# Patient Record
Sex: MALE | Race: WHITE | NOT HISPANIC OR LATINO | Employment: FULL TIME | URBAN - METROPOLITAN AREA
[De-identification: names, ages, dates, MRNs, and addresses within clinical notes are randomized per-mention and may not be internally consistent; named-entity substitution may affect disease eponyms.]

---

## 2017-04-20 ENCOUNTER — OFFICE VISIT (OUTPATIENT)
Dept: FAMILY MEDICINE | Facility: CLINIC | Age: 47
End: 2017-04-20
Payer: COMMERCIAL

## 2017-04-20 VITALS — SYSTOLIC BLOOD PRESSURE: 125 MMHG | TEMPERATURE: 99.1 F | HEART RATE: 68 BPM | DIASTOLIC BLOOD PRESSURE: 73 MMHG

## 2017-04-20 DIAGNOSIS — Z23 NEED FOR VACCINATION: ICD-10-CM

## 2017-04-20 DIAGNOSIS — Z71.84 TRAVEL ADVICE ENCOUNTER: Primary | ICD-10-CM

## 2017-04-20 PROCEDURE — 90691 TYPHOID VACCINE IM: CPT | Mod: GA | Performed by: NURSE PRACTITIONER

## 2017-04-20 PROCEDURE — 90472 IMMUNIZATION ADMIN EACH ADD: CPT | Mod: GA | Performed by: NURSE PRACTITIONER

## 2017-04-20 PROCEDURE — 90707 MMR VACCINE SC: CPT | Mod: GA | Performed by: NURSE PRACTITIONER

## 2017-04-20 PROCEDURE — 90471 IMMUNIZATION ADMIN: CPT | Mod: GA | Performed by: NURSE PRACTITIONER

## 2017-04-20 PROCEDURE — 90686 IIV4 VACC NO PRSV 0.5 ML IM: CPT | Mod: GA | Performed by: NURSE PRACTITIONER

## 2017-04-20 PROCEDURE — 90632 HEPA VACCINE ADULT IM: CPT | Mod: GA | Performed by: NURSE PRACTITIONER

## 2017-04-20 PROCEDURE — 90715 TDAP VACCINE 7 YRS/> IM: CPT | Mod: GA | Performed by: NURSE PRACTITIONER

## 2017-04-20 PROCEDURE — 99402 PREV MED CNSL INDIV APPRX 30: CPT | Mod: 25 | Performed by: NURSE PRACTITIONER

## 2017-04-20 RX ORDER — ATOVAQUONE AND PROGUANIL HYDROCHLORIDE 250; 100 MG/1; MG/1
1 TABLET, FILM COATED ORAL DAILY
Qty: 15 TABLET | Refills: 0 | Status: SHIPPED | OUTPATIENT
Start: 2017-04-20 | End: 2021-04-27

## 2017-04-20 RX ORDER — AZITHROMYCIN 500 MG/1
500 TABLET, FILM COATED ORAL DAILY
Qty: 3 TABLET | Refills: 0 | Status: SHIPPED | OUTPATIENT
Start: 2017-04-20 | End: 2017-04-23

## 2017-04-20 NOTE — PROGRESS NOTES
Nurse Note      Itinerary:  [facility]       Departure Date: 05/04/2017      Return Date: 05/08/2017      Length of Trip 4 days      Reason for Travel: [place] work           Urban or rural: both      Accommodations: Family home        IMMUNIZATION HISTORY  Have you received any immunizations within the past 4 weeks?  No  Have you ever fainted from having your blood drawn or from an injection?  Yes  Have you ever had a fever reaction to vaccination?  No  Have you ever had any bad reaction or side effect from any vaccination?  No  Have you ever had hepatitis A or B vaccine?  Yes  Do you live (or work closely) with anyone who has AIDS, an AIDS-like condition, any other immune disorder or who is on chemotherapy for cancer?  Yes  Do you have a family history of immunodeficiency?  No  Have you received any injection of immune globulin or any blood products during the past 12 months?  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  Patient will be departing in  2 week(s) and staying for   4 day(s) and  traveling with [facility] through [place].      Patient itinerary :  will be in the urban region of [facility] [place] [facility] which presents risk for Malaria, Dengue Fever, Chikungungya, Zika, Rabies, food borne illnesses, motor vehicle accidents, Typhoid and Chagas disease. exposure.      Patient's activities will include volunteer work.    Patient's country of birth is [place]    Special medical concerns: none  Pre-travel questionnaire was completed by patient and reviewed by provider.     Vitals: There were no vitals taken for this visit.  BMI= There is no height or weight on file to calculate BMI.    EXAM:  General:  Well-nourished, well-developed in no acute distress.  Appears to be stated age, interacts appropriately and expresses understanding of information given to patient.    Current  Outpatient Prescriptions   Medication Sig Dispense Refill     atorvastatin (LIPITOR) 40 MG tablet   0     aspirin 81 MG tablet Take by mouth daily       Patient Active Problem List   Diagnosis     ADD (attention deficit disorder) without hyperactivity     CARDIOVASCULAR SCREENING; LDL GOAL LESS THAN 160     Diverticulitis of colon     MONTY (obstructive sleep apnea)     Syncope and collapse     Obesity     No Known Allergies      Immunizations discussed include:   Hepatitis A:  Ordered/given today, risks, benefits and side effects reviewed  Hepatitis B: deferred  Influenza: Ordered/given today, risks, benefits and side effects reviewed  Typhoid: Ordered/given today, risks, benefits and side effects reviewed  Rabies: Insufficient time to vaccinate  Yellow Fever: Not indicated  Japanese Encephalitis: Not indicated  Meningococcus: Not indicated  Tetanus/Diphtheria: Ordered/given today, risks, benefits and side effects reviewed  Measles/Mumps/Rubella: Ordered/given today  Cholera: Not needed  Polio: Up to date  Pneumococcal: Under age of 65  Varicella: Immune by disease history per patient report  Zostavax:  Not indicated  HPV:  Not indicated  TB:  Low risk    [place] Exposure on this trip: No    ASSESSMENT/PLAN:    ICD-10-CM    1. Travel advice encounter Z71.89 TDAP VACCINE (ADACEL)     HEPA VACCINE ADULT IM     TYPHOID VACCINE, IM     HC FLU VAC PRESRV FREE QUAD SPLIT VIR 3+YRS IM     MMR VIRUS IMMUNIZATION, SUBCUT     azithromycin (ZITHROMAX) 500 MG tablet     atovaquone-proguanil (MALARONE) 250-100 MG per tablet   2. Need for vaccination Z23 TDAP VACCINE (ADACEL)     HEPA VACCINE ADULT IM     TYPHOID VACCINE, IM     HC FLU VAC PRESRV FREE QUAD SPLIT VIR 3+YRS IM     MMR VIRUS IMMUNIZATION, SUBCUT     I have reviewed general recommendations for safe travel   including: food/water precautions, insect precautions, safer sex   practices given high prevalence of Zika, HIV and other STDs,   roadway safety. Educational  materials and [facility] report provided.    Malaraia prophylaxis recommended: Malarone  Symptomatic treatment for traveler's diarrhea: azithromycin  [place] illness prevention and treatment: no      Evacuation insurance advised and resources were provided to patient.    Total visit time 30 minutes  with over 50% of time spent counseling patient as detailed above.    Adela Flores CNP

## 2017-04-20 NOTE — MR AVS SNAPSHOT
After Visit Summary   4/20/2017    Joe De La Garza    MRN: [id number]           Patient Information     Date Of Birth          1970        Visit Information        Provider Department      4/20/2017 10:15 AM Adela Flores APRN CNP [facility]        Today's Diagnoses     Travel advice encounter    -  1    Need for vaccination          Care Instructions    Today April 20, 2017 you received the    Flu Vaccine    Hepatitis A Vaccine - Please return on 10/17/17 or later for your 2nd and final dose.    Tetanus (Tdap) Vaccine    MMR (Measles Mumps Rubella) Vaccine    Typhoid - injectable. This vaccine is valid for two years.   .    These appointments can be made as a NURSE ONLY visit.    **It is very important for the vaccinations to be given on the scheduled day(s), this helps ensure you receive the full effectiveness of the vaccine.**    Please call [facility] with any questions [phone]    Thank you for visiting [facility]            Follow-ups after your visit        Who to contact     If you have questions or need follow up information about today's clinic visit or your schedule please contact [facility] directly at [phone].  Normal or non-critical lab and imaging results will be communicated to you by MyChart, letter or phone within 4 business days after the clinic has received the results. If you do not hear from us within 7 days, please contact the clinic through [facility] or phone. If you have a critical or abnormal lab result, we will notify you by phone as soon as possible.  Submit refill requests through [facility] or call your pharmacy and they will forward the refill request to us. Please allow 3 business days for your refill to be completed.          Additional Information About Your Visit        MyChart Information     [facility] lets you send messages to your doctor, view your test results, renew your  "prescriptions, schedule appointments and more. To sign up, go to [email or web address] . Click on \"Log in\" on the left side of the screen, which will take you to the Welcome page. Then click on \"Sign up Now\" on the right side of the page.     You will be asked to enter the access code listed below, as well as some personal information. Please follow the directions to create your username and password.     Your access code is: [email or web address]  Expires: 2017 10:43 AM     Your access code will  in 90 days. If you need help or a new code, please call your [facility] clinic or [phone].        Care EveryWhere ID     This is your Care EveryWhere ID. This could be used by other organizations to access your [facility] medical records  [id number]        Your Vitals Were     Pulse Temperature                68 99.1  F (37.3  C) (Oral)           Blood Pressure from Last 3 Encounters:   17 125/73   14 136/83   14 127/78    Weight from Last 3 Encounters:   14 277 lb 3.2 oz (125.7 kg)   14 (!) 304 lb (137.9 kg)   10/02/13 297 lb (134.7 kg)              We Performed the Following     HC FLU VAC PRESRV FREE QUAD SPLIT VIR 3+YRS IM     HEPA VACCINE ADULT IM     MMR VIRUS IMMUNIZATION, SUBCUT     TDAP VACCINE (ADACEL)     TYPHOID VACCINE, IM          Today's Medication Changes          These changes are accurate as of: 17 10:43 AM.  If you have any questions, ask your nurse or doctor.               Start taking these medicines.        Dose/Directions    atovaquone-proguanil 250-100 MG per tablet   Commonly known as:  MALARONE   Used for:  Travel advice encounter   Started by:  Adela Flores APRN CNP        Dose:  1 tablet   Take 1 tablet by mouth daily Start 2 days before exposure to Malaria and continue daily till  7 days after exposure.   Quantity:  15 tablet   Refills:  0       azithromycin 500 MG tablet   Commonly known as:  ZITHROMAX   Used for:  Travel advice encounter "   Started by:  Adela Flores APRN CNP        Dose:  500 mg   Take 1 tablet (500 mg) by mouth daily for 3 doses Take 1 tablet a day for up to 3 days for severe diarrhea   Quantity:  3 tablet   Refills:  0            Where to get your medicines      These medications were sent to [facility] - [place] - [address] [place] AT [facility] [address] [place] [address]     Phone:  [phone]     atovaquone-proguanil 250-100 MG per tablet    azithromycin 500 MG tablet                Primary Care Provider Office Phone # Fax #    Barbie Tin Mann [phone]       [address] [address]        Thank you!     Thank you for choosing [facility]  for your care. Our goal is always to provide you with excellent care. Hearing back from our patients is one way we can continue to improve our services. Please take a few minutes to complete the written survey that you may receive in the mail after your visit with us. Thank you!             Your Updated Medication List - Protect others around you: Learn how to safely use, store and throw away your medicines at www.disposemymeds.org.          This list is accurate as of: 4/20/17 10:43 AM.  Always use your most recent med list.                   Brand Name Dispense Instructions for use    aspirin 81 MG tablet      Take by mouth daily       atovaquone-proguanil 250-100 MG per tablet    MALARONE    15 tablet    Take 1 tablet by mouth daily Start 2 days before exposure to Malaria and continue daily till  7 days after exposure.       azithromycin 500 MG tablet    ZITHROMAX    3 tablet    Take 1 tablet (500 mg) by mouth daily for 3 doses Take 1 tablet a day for up to 3 days for severe diarrhea

## 2017-04-20 NOTE — PATIENT INSTRUCTIONS
Today April 20, 2017 you received the    Flu Vaccine    Hepatitis A Vaccine - Please return on 10/17/17 or later for your 2nd and final dose.    Tetanus (Tdap) Vaccine    MMR (Measles Mumps Rubella) Vaccine    Typhoid - injectable. This vaccine is valid for two years.   .    These appointments can be made as a NURSE ONLY visit.    **It is very important for the vaccinations to be given on the scheduled day(s), this helps ensure you receive the full effectiveness of the vaccine.**    Please call [facility] with any questions [phone]    Thank you for visiting [facility]

## 2017-05-11 ENCOUNTER — TELEPHONE (OUTPATIENT)
Dept: FAMILY MEDICINE | Facility: CLINIC | Age: 47
End: 2017-05-11

## 2017-05-11 NOTE — TELEPHONE ENCOUNTER
Back from [facility] on Monday, felt ok on Tuesday but on Wednesday night upset stomach, chills, aches, head ache  Approx. 2 bouts of diarrhea in the past 24 hrs, eating and drinking fine.  Did take one dose of azithromycin for TD  Says diarrhea isn't too bad  Advised to rest, hydrate and tylenol or ibuprofen for body aches  Wife just finished chemo a month ago, continue with meticulous hand hygiene and recommend separate bathrooms and good clean up with bleach based   Pt well versed on hand hygiene and contact precautions, will continue to follow while ill  If not improving in the next 3-4 days or worsens will call back to clinic  Pt agreed with plan.  Tania Rajan RN

## 2017-05-11 NOTE — TELEPHONE ENCOUNTER
Reason for call:  Patient reporting a symptom    Symptom or request: Diarrhea, fever 99.5, muscle aches, chills, head ache    Duration (how long have symptoms been present): 2days    Have you been treated for this before? No    Additional comments: Pt travelled to [facility] on Monday night, pt was in [facility] for four days    Phone Number patient can be reached at:  Home number on file [phone] (home)    Best Time:  anytime    Can we leave a detailed message on this number:  YES    Call taken on 5/11/2017 at 10:37 AM by Judith Fletcher

## 2021-03-01 ENCOUNTER — ANCILLARY PROCEDURE (OUTPATIENT)
Dept: GENERAL RADIOLOGY | Facility: CLINIC | Age: 51
End: 2021-03-01
Attending: NURSE PRACTITIONER
Payer: COMMERCIAL

## 2021-03-01 ENCOUNTER — OFFICE VISIT (OUTPATIENT)
Dept: URGENT CARE | Facility: URGENT CARE | Age: 51
End: 2021-03-01
Payer: COMMERCIAL

## 2021-03-01 VITALS
DIASTOLIC BLOOD PRESSURE: 87 MMHG | OXYGEN SATURATION: 96 % | RESPIRATION RATE: 16 BRPM | HEART RATE: 80 BPM | SYSTOLIC BLOOD PRESSURE: 145 MMHG | BODY MASS INDEX: 41.06 KG/M2 | TEMPERATURE: 98.6 F | WEIGHT: 294.4 LBS
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
## 2021-03-01 DIAGNOSIS — M25.512 ACUTE PAIN OF LEFT SHOULDER: Primary | ICD-10-CM

## 2021-03-01 DIAGNOSIS — E66.01 MORBID OBESITY (H): ICD-10-CM

## 2021-03-01 PROCEDURE — 99204 OFFICE O/P NEW MOD 45 MIN: CPT | Performed by: NURSE PRACTITIONER

## 2021-03-01 PROCEDURE — 73030 X-RAY EXAM OF SHOULDER: CPT | Mod: LT | Performed by: RADIOLOGY

## 2021-03-01 ASSESSMENT — PAIN SCALES - GENERAL: PAINLEVEL: MILD PAIN (2)

## 2021-03-01 NOTE — PATIENT INSTRUCTIONS
Ice Ice Ice.    Wear sling for comfort.    Naproxen 500mg twice a day with food.    Patient Education     Rotator Cuff Tear  The rotator cuff is a group of muscles and tendons that surround the shoulder joint. These muscles and tendons hold the arm in its joint. They also help the shoulder move and rotate. The rotator cuff can be torn from overuse or injury. Gradual wear and tear can lead to inflammation of these tendons. This can progress to gradual or sudden tears.  Symptoms of a torn rotator cuff include:    Shoulder pain that gets worse when you raise your arm overhead    Weakness of the shoulder muscles with overhead activity    Popping and clicking when you move your shoulder    Shoulder pain that wakes you up at night when sleeping on the hurt shoulder  Your healthcare provider may suspect a rotator cuff injury based on your symptoms and a physical exam. You may also have an MRI or arthroscopy. Arthroscopy is a surgical procedure to look inside the joint through a small tube. X-rays may be taken to determine if there is another reason for your pain, such as an abnormality in the bone.  Partial rotator cuff tears can be treated by first resting, then strengthening the rotator cuff muscles. Anti-inflammatory medicines, such as ibuprofen or naproxen, are useful. Your healthcare provider can give you a limited number of steroid injections. Your provider may recommend surgery for complete tears and partial tears that don't respond to medical treatment.  Home care    Try to avoid activities that make your pain worse. This includes overhead activities, doing the same motion over and over, and heavy lifting.    You may use over-the-counter pain medicines to control pain, unless another medicine was prescribed. If you have chronic liver or kidney disease or ever had a stomach ulcer or gastrointestinal bleeding, talk with your healthcare provider before using these medicines.    If you were given a sling, use it for  comfort. After your pain decreases, don t keep your arm in the sling all the time. Take your arm out several times a day and move the shoulder joint, as you are able.    Your healthcare provider may recommend gentle pendulum exercises. Stand or sit with your arm vertical and close to your side. Relax your shoulder muscles and gently swing the arm forward and back, side to side, and in small circles for about 5 minutes. Do this once or twice a day. There should be only slight pain with this exercise.    You may benefit from physical therapy or a home exercise program to strengthen your shoulder muscles. This will also increase your pain-free range of motion. Applying heat before exercises can help prepare the muscles and joint for activity. Talk to your healthcare provider about what is best for your condition.  Follow-up care  Follow up with your healthcare provider, or as advised.  When to seek medical advice  Call your healthcare provider right away if the following occurs:    Increasing shoulder pain or pain radiating down the arm to the hand  Call 911  Call 911 or get immediate medical care if any of the following occur:    Rapid swelling in the involved shoulder or arm    Numbness, tingling, or loss of strength down the arm to the hand  Noreen last reviewed this educational content on 5/1/2018 2000-2020 [employer]. [address] [place] [address], [place] [address]. All rights reserved. This information is not intended as a substitute for professional medical care. Always follow your healthcare professional's instructions.

## 2021-03-01 NOTE — PROGRESS NOTES
Chief Complaint   Patient presents with     Fall     Patient slipped on the ice today and fell onto his left side, his shoulder took the impact. States with sitting pain is 2/10, with movement 8/10.         ICD-10-CM    1. Acute pain of left shoulder  M25.512 XR Shoulder Left G/E 3 Views     PHYSICAL THERAPY REFERRAL     Orthopedic & Spine  Referral   2. Fall due to slipping on ice or snow, initial encounter  W00.9XXA XR Shoulder Left G/E 3 Views   3. Morbid obesity (H)  E66.01    Injury is highly suspicious for rotator cuff tear.  Will treat pain and refer to physical therapy and orthopedics for further evaluation and treatment.    Medical Decision Making    Differential Diagnosis:  MS Injury Pain: sprain, fracture, tendonitis, muscle strain, contusion, dislocation, bursitis and osteoarthritis    Xray - Reviewed and interpreted by me.  X-ray of left shoulder shows no acute fractures or dislocations.    Results for orders placed or performed in visit on 03/01/21 (from the past 24 hour(s))   XR Shoulder Left G/E 3 Views    Narrative    XR SHOULDER LT G/E 3 VW  3/1/2021 2:08 PM     HISTORY: Acute pain of left shoulder; Fall due to slipping on ice or  snow, initial encounter    COMPARISON: None      Impression    IMPRESSION: No acute fracture or malalignment. There is normal  glenohumeral joint spacing. Mild acromioclavicular joint degenerative  changes. Calcification in the acromiohumeral interval suggests  calcific tendinopathy.      PRISCILA FREDERICK MD       Subjective     Joe De La Garza is an 50 year oldmale who presents to clinic today for pain in the right posterior shoulder since falling on the ice about one hour ago.  No treatment since occurrence. No previous injury to this area.  Patient denies any pain in other areas and did not hit his head.  He has very limited range of motion of his shoulder      Objective    BP (!) 145/87   Pulse 80   Temp 98.6  F (37  C) (Tympanic)   Resp 16   Wt 133.5  kg (294 lb 6.4 oz)   SpO2 96%   BMI 41.06 kg/m      Physical Exam       GENERAL APPEARANCE: alert and mild distress     EYES: PERRL, EOMI, sclera non-icteric     HENT: oral exam benign, mucus membranes intact, without ulcers or lesions     NECK: no adenopathy or asymmetry, thyroid normal to palpation     RESP: lungs clear to auscultation - no rales, rhonchi or wheezes     CV: regular rates and rhythm, no murmurs, rubs, or gallop     MS: extremities normal- no gross deformities noted except for the right shoulder which has limited range of motion.  Pain is not reproducible by palpation of right upper extremity shoulder and clavicle.  He is not able to externally rotate his arm at all has limited flexion and extension and is unable to raise the arm over his head.     SKIN: no suspicious lesions or rashes     NEURO: Normal strength and tone, mentation intact and speech normal    Patient Instructions   Ice Ice Ice.    Wear sling for comfort.    Naproxen 500mg twice a day with food.    Patient Education     Rotator Cuff Tear  The rotator cuff is a group of muscles and tendons that surround the shoulder joint. These muscles and tendons hold the arm in its joint. They also help the shoulder move and rotate. The rotator cuff can be torn from overuse or injury. Gradual wear and tear can lead to inflammation of these tendons. This can progress to gradual or sudden tears.  Symptoms of a torn rotator cuff include:    Shoulder pain that gets worse when you raise your arm overhead    Weakness of the shoulder muscles with overhead activity    Popping and clicking when you move your shoulder    Shoulder pain that wakes you up at night when sleeping on the hurt shoulder  Your healthcare provider may suspect a rotator cuff injury based on your symptoms and a physical exam. You may also have an MRI or arthroscopy. Arthroscopy is a surgical procedure to look inside the joint through a small tube. X-rays may be taken to determine if  there is another reason for your pain, such as an abnormality in the bone.  Partial rotator cuff tears can be treated by first resting, then strengthening the rotator cuff muscles. Anti-inflammatory medicines, such as ibuprofen or naproxen, are useful. Your healthcare provider can give you a limited number of steroid injections. Your provider may recommend surgery for complete tears and partial tears that don't respond to medical treatment.  Home care    Try to avoid activities that make your pain worse. This includes overhead activities, doing the same motion over and over, and heavy lifting.    You may use over-the-counter pain medicines to control pain, unless another medicine was prescribed. If you have chronic liver or kidney disease or ever had a stomach ulcer or gastrointestinal bleeding, talk with your healthcare provider before using these medicines.    If you were given a sling, use it for comfort. After your pain decreases, don t keep your arm in the sling all the time. Take your arm out several times a day and move the shoulder joint, as you are able.    Your healthcare provider may recommend gentle pendulum exercises. Stand or sit with your arm vertical and close to your side. Relax your shoulder muscles and gently swing the arm forward and back, side to side, and in small circles for about 5 minutes. Do this once or twice a day. There should be only slight pain with this exercise.    You may benefit from physical therapy or a home exercise program to strengthen your shoulder muscles. This will also increase your pain-free range of motion. Applying heat before exercises can help prepare the muscles and joint for activity. Talk to your healthcare provider about what is best for your condition.  Follow-up care  Follow up with your healthcare provider, or as advised.  When to seek medical advice  Call your healthcare provider right away if the following occurs:    Increasing shoulder pain or pain radiating  down the arm to the hand  Call 911  Call 911 or get immediate medical care if any of the following occur:    Rapid swelling in the involved shoulder or arm    Numbness, tingling, or loss of strength down the arm to the hand  Noreen last reviewed this educational content on 5/1/2018 2000-2020 [employer]. [address] [place] [address]. All rights reserved. This information is not intended as a substitute for professional medical care. Always follow your healthcare professional's instructions.               TERESA Lechuga, CNP  [facility] Provider

## 2021-04-26 ENCOUNTER — E-VISIT (OUTPATIENT)
Dept: FAMILY MEDICINE | Facility: CLINIC | Age: 51
End: 2021-04-26
Payer: COMMERCIAL

## 2021-04-26 DIAGNOSIS — Z12.11 COLON CANCER SCREENING: ICD-10-CM

## 2021-04-26 DIAGNOSIS — R10.32 LLQ ABDOMINAL PAIN: Primary | ICD-10-CM

## 2021-04-26 PROCEDURE — 99421 OL DIG E/M SVC 5-10 MIN: CPT | Performed by: FAMILY MEDICINE

## 2021-05-08 ENCOUNTER — HEALTH MAINTENANCE LETTER (OUTPATIENT)
Age: 51
End: 2021-05-08

## 2021-06-21 ENCOUNTER — TELEPHONE (OUTPATIENT)
Dept: FAMILY MEDICINE | Facility: CLINIC | Age: 51
End: 2021-06-21

## 2021-06-21 NOTE — TELEPHONE ENCOUNTER
Patient Quality Outreach      Summary:    Patient has the following on his problem list/HM: None    Patient is due/failing the following:   Colonoscopy    Type of outreach:    Sent [employer] message.    Questions for provider review:    None                                                                                                                                     Madeline Esparza

## 2021-07-04 ENCOUNTER — E-VISIT (OUTPATIENT)
Dept: FAMILY MEDICINE | Facility: CLINIC | Age: 51
End: 2021-07-04
Payer: COMMERCIAL

## 2021-07-04 DIAGNOSIS — Z87.19 HX OF DIVERTICULITIS OF COLON: ICD-10-CM

## 2021-07-04 DIAGNOSIS — R10.32 LLQ ABDOMINAL PAIN: ICD-10-CM

## 2021-07-04 PROCEDURE — 99421 OL DIG E/M SVC 5-10 MIN: CPT | Performed by: FAMILY MEDICINE

## 2021-07-05 RX ORDER — METRONIDAZOLE 500 MG/1
500 TABLET ORAL 3 TIMES DAILY
Qty: 30 TABLET | Refills: 0 | Status: SHIPPED | OUTPATIENT
Start: 2021-07-05 | End: 2024-01-03

## 2021-07-05 RX ORDER — CIPROFLOXACIN 500 MG/1
500 TABLET, FILM COATED ORAL 2 TIMES DAILY
Qty: 20 TABLET | Refills: 0 | Status: SHIPPED | OUTPATIENT
Start: 2021-07-05 | End: 2024-01-03

## 2021-10-23 ENCOUNTER — HEALTH MAINTENANCE LETTER (OUTPATIENT)
Age: 51
End: 2021-10-23

## 2022-06-04 ENCOUNTER — HEALTH MAINTENANCE LETTER (OUTPATIENT)
Age: 52
End: 2022-06-04

## 2022-10-10 ENCOUNTER — HEALTH MAINTENANCE LETTER (OUTPATIENT)
Age: 52
End: 2022-10-10

## 2022-10-20 ENCOUNTER — E-VISIT (OUTPATIENT)
Dept: FAMILY MEDICINE | Facility: CLINIC | Age: 52
End: 2022-10-20
Payer: COMMERCIAL

## 2022-10-20 DIAGNOSIS — R10.32 LLQ ABDOMINAL PAIN: ICD-10-CM

## 2022-10-20 DIAGNOSIS — Z12.11 COLON CANCER SCREENING: Primary | ICD-10-CM

## 2022-10-20 PROCEDURE — 99421 OL DIG E/M SVC 5-10 MIN: CPT | Performed by: FAMILY MEDICINE

## 2022-10-31 ENCOUNTER — TELEPHONE (OUTPATIENT)
Dept: GASTROENTEROLOGY | Facility: CLINIC | Age: 52
End: 2022-10-31

## 2022-10-31 ENCOUNTER — HOSPITAL ENCOUNTER (OUTPATIENT)
Facility: AMBULATORY SURGERY CENTER | Age: 52
End: 2022-10-31
Attending: INTERNAL MEDICINE
Payer: COMMERCIAL

## 2022-10-31 NOTE — TELEPHONE ENCOUNTER
Screening Questions  BLUE  KIND OF PREP RED  LOCATION [review exclusion criteria] GREEN  SEDATION TYPE        y Are you active on mychart?       Johnathan, Barbie Ordering/Referring Provider?        [employer] What type of coverage do you have?      n Have you had a positive covid test in the last 90 days?     40.4 1. BMI  [BMI 40+ - review exclusion criteria]    y  2. Are you able to give consent for your medical care? [IF NO,RN REVIEW]        n  3. Are you taking any prescription pain medications on a routine schedule?      n  3a. EXTENDED PREP What kind of prescription?     n 4. Do you have any chemical dependencies such as alcohol, street drugs, or methadone?    n 5. Do you have any history of post-traumatic stress syndrome, severe anxiety or history of psychosis?      **If yes 3- 5 , please schedule with MAC sedation.**          IF YES TO ANY 6 - 10 - HOSPITAL SETTING ONLY.     n 6.   Do you need assistance transferring?     n 7.   Have you had a heart or lung transplant?    n 8.   Are you currently on dialysis?   n 9.   Do you use daily home oxygen?   n 10. Do you take nitroglycerin?   10a.  If yes, how often?     11. [FEMALES]  n Are you currently pregnant?    11a.  If yes, how many weeks? [ Greater than 12 weeks, OR NEEDED]    n 12. Do you have Pulmonary Hypertension? *NEED PAC APPT AT [facility]*     n 13. [review exclusion criteria]  Do you have any implantable devices in your body (pacemaker, defib, LVAD)?    n 14. In the past 6 months, have you had any heart related issues including cardiomyopathy or heart attack?     14a.  If yes, did it require cardiac stenting if so when?     n 15. Have you had a stroke or Transient ischemic attack (TIA - aka  mini stroke ) within 6 months?      n 16. Do you have mod to severe Obstructive Sleep Apnea?  [Hospital only - Ok at [place]]    n 17. Do you have SEVERE AND UNCONTROLLED asthma? *NEED PAC APPT AT [facility]*     n 18. Are you currently taking any blood thinners?     18a.  "If yes, inform patient to \"follow up w/ ordering provider for bridging instructions.\"    n 19. Do you take the medication Phentermine?    19a. If yes, \"Hold for 7 days before procedure.  Please consult your prescribing provider if you have questions about holding this medication.\"     n  20. Do you have chronic kidney disease?      n  21. Do you have a diagnosis of diabetes?     n  22. On a regular basis do you go 3-5 days between bowel movements?      23. Preferred LOCAL Pharmacy for Pre Prescription    [ LIST ONLY ONE PHARMACY]     [facility] #[id number] [place] [id number] [place] AT [facility]  [address]        - CLOSING REMINDERS -    Informed patient they will need an adult    Cannot take any type of public or medical transportation alone    Conscious Sedation- Needs  for 6 hours after the procedure       MAC/General-Needs  for 24 hours after procedure    Pre-Procedure Covid test to be completed [[facility] PCR Testing Required]    Confirmed Nurse will call to complete assessment       - SCHEDULING DETAILS -     Tanner  Surgeon    1/2/23   Date of Procedure  Lower Endoscopy [Colonoscopy]  Type of Procedure Scheduled   MG Location  Extended PREP   SC Sedation Type     n PAC / Pre-op Required         Additional comments:            "

## 2022-12-01 ENCOUNTER — TELEPHONE (OUTPATIENT)
Dept: GASTROENTEROLOGY | Facility: CLINIC | Age: 52
End: 2022-12-01

## 2022-12-01 ENCOUNTER — MYC MEDICAL ADVICE (OUTPATIENT)
Dept: CALL CENTER | Age: 52
End: 2022-12-01

## 2022-12-01 NOTE — TELEPHONE ENCOUNTER
Caller: To Joe    Procedure: Colon    Date, Location, and Surgeon of Procedure Cancelled: 01/2, Seb SCRUGGS    Ordering Provider:Barbie Mann MD     Reason for cancel (please be detailed, any staff messages or encounters to note?): System Recognized Holiday        Rescheduled: N, wants a call back to reschedule     If rescheduled:    Date:    Location:    Prep Resent: (changes to prep?)   Covid Test Rescheduled:    Note any change or update to original order/sedation:

## 2022-12-05 ENCOUNTER — VIRTUAL VISIT (OUTPATIENT)
Dept: FAMILY MEDICINE | Facility: CLINIC | Age: 52
End: 2022-12-05
Payer: COMMERCIAL

## 2022-12-05 DIAGNOSIS — U07.1 INFECTION DUE TO 2019 NOVEL CORONAVIRUS: Primary | ICD-10-CM

## 2022-12-05 PROCEDURE — 99213 OFFICE O/P EST LOW 20 MIN: CPT | Mod: CS | Performed by: NURSE PRACTITIONER

## 2022-12-05 ASSESSMENT — ENCOUNTER SYMPTOMS
HEADACHES: 1
COUGH: 1
SORE THROAT: 1

## 2022-12-05 NOTE — PROGRESS NOTES
Joe is a 52 year old who is being evaluated via a billable video visit.      How would you like to obtain your AVS? MyChart  If the video visit is dropped, the invitation should be resent by: Text to cell phone: [phone]  Will anyone else be joining your video visit? No          Assessment & Plan     (U07.1) Infection due to 2019 novel coronavirus  (primary encounter diagnosis)  Comment:   Plan: he assures me he has never been told he has poor kidney function.   Symptom management and med use/risk/benefit                 No follow-ups on file.    Yolanda Lei NP  [facility]    Subjective   Joe is a 52 year old presenting for the following health issues: tested positive for COVID 12/02/2022, would like to discuss antiviral treatment,, Symptoms started 4 days. No SOB. Some fever.    Covid Concern, Headache, Cough, and Pharyngitis      Headache     Cough  Associated symptoms include headaches and sore throat.   Pharyngitis   Associated symptoms include headaches and cough.          COVID-19 Symptom Review  How many days ago did these symptoms start? Symptoms started 12/1/22    Are any of the following symptoms significant for you?    New or worsening difficulty breathing? No    Worsening cough? Yes, it's a dry cough. - does get productive as the day goes on    Fever or chills? Yes, the highest temperature was 101    Headache: YES    Sore throat: YES    Chest pain: No    Diarrhea: No     Body aches? YES    What treatments has patient tried? DayQuil   Does patient live in a nursing home, group home, or shelter? No   Does patient have a way to get food/medications during quarantined? Yes, I have a friend or family member who can help me.                  Review of Systems   HENT: Positive for sore throat.    Respiratory: Positive for cough.    Neurological: Positive for headaches.            Objective           Vitals:  No vitals were obtained today due to virtual visit.    Physical  Exam   GENERAL: Healthy, alert and no distress  EYES: Eyes grossly normal to inspection.  No discharge or erythema, or obvious scleral/conjunctival abnormalities.  RESP: No audible wheeze, cough, or visible cyanosis.  No visible retractions or increased work of breathing.    SKIN: Visible skin clear. No significant rash, abnormal pigmentation or lesions.  NEURO: Cranial nerves grossly intact.  Mentation and speech appropriate for age.  PSYCH: Mentation appears normal, affect normal/bright, judgement and insight intact, normal speech and appearance well-groomed.                Video-Visit Details    Video Start Time: 1205    Type of service:  Video Visit    Video End Time:12:20 PM    Originating Location (pt. Location): Home        Distant Location (provider location):  On-site    Platform used for Video Visit: Libby

## 2023-01-25 ENCOUNTER — TELEPHONE (OUTPATIENT)
Dept: GASTROENTEROLOGY | Facility: CLINIC | Age: 53
End: 2023-01-25

## 2023-01-25 ENCOUNTER — TELEPHONE (OUTPATIENT)
Dept: GASTROENTEROLOGY | Facility: CLINIC | Age: 53
End: 2023-01-25
Payer: COMMERCIAL

## 2023-01-25 DIAGNOSIS — Z12.11 ENCOUNTER FOR SCREENING COLONOSCOPY: Primary | ICD-10-CM

## 2023-01-25 RX ORDER — BISACODYL 5 MG
TABLET, DELAYED RELEASE (ENTERIC COATED) ORAL
Qty: 4 TABLET | Refills: 0 | Status: SHIPPED | OUTPATIENT
Start: 2023-01-25 | End: 2024-01-03

## 2023-01-25 NOTE — TELEPHONE ENCOUNTER
Caller: Joe De La Garza    Reason for Reschedule/Cancellation (please be detailed, any staff messages or encounters to note?): Per staff message patient to be rescheduled at hospital setting due to MONTY.    Marian Vang RN  P Endoscopy Scheduling Pool  Could someone please reach out to patient and r/s at the hospital (see note below).  Thank you,   José Miguel Xavier MD Soldo, Jennifer, RN; PAVAN Collins   Cc: PAVAN Alcaraz Rn Gi Procedures   Due to no recent sleep study I can see and past hypoxic episodes would be best at hospital.     Sunday           Previous Messages       ----- Message -----   From: Marian Vang RN   Sent: 1/24/2023  11:27 AM CST   To: Mg Glory Collins,  Rn Gi Procedures   Subject: MONTY                                               Please review and advise to determine if patient can proceed with scheduled procedure or if a hospital setting is recommended.     Patient is scheduled for a Colonoscopy   Date of procedure: 02.03.2023   Indication: screening   Sedation type: Conscious sedation     Reason for review: moderate to severe MONTY       Thank you,   Marian Vang RN   Endoscopy Procedure Pre Assessment RN   [phone] option 4       Prior to reschedule please review:    Ordering Provider:Barbie Mann MD    Sedation per order:MODERATE    Does patient have any ASC Exclusions, please identify?: YES, MONTY      Notes on Cancelled Procedure:    Procedure:Lower Endoscopy [Colonoscopy]     Date: 2/3    Location:[facility]; [address], [place] [address]    Surgeon: ISAEL        Rescheduled: Yes    Procedure: Lower Endoscopy [Colonoscopy]     Date: 2/3    Location:[facility]; [address] [place] [address]    Surgeon: HAYDEE RANDALL    Sedation Level Scheduled  MODERATE,  Reason for Sedation Level PER ORDER    Prep/Instructions updated and sent: RESENT VIA [employer]

## 2023-01-25 NOTE — TELEPHONE ENCOUNTER
Sent to scheduling to r/s at the hospital.  See note below.         José Miguel Hoyt MD Soldo, Jennifer, RN; PAVAN Holder Review  Cc: P Mg Rn Gi Procedures  Due to no recent sleep study I can see and past hypoxic episodes would be best at hospital.     Sunday           Previous Messages     ----- Message -----   From: Marian Vang RN   Sent: 1/24/2023  11:27 AM CST   To: Mg Holder Review, Mg Rn Gi Procedures   Subject: MONTY                                               Please review and advise to determine if patient can proceed with scheduled procedure or if a hospital setting is recommended.     Patient is scheduled for a Colonoscopy   Date of procedure: 02.03.2023   Indication: screening   Sedation type: Conscious sedation     Reason for review: moderate to severe MONTY       Thank you,   Marian Vang RN   Endoscopy Procedure Pre Assessment RN   [phone] option 4

## 2023-01-25 NOTE — TELEPHONE ENCOUNTER
Patient scheduled for Colonoscopy  on 2/3/23.     Discuss Covid policy.     Pre op exam scheduled: N/A    Arrival time: 1230. Procedure time 1330    Facility location: [facility]; [address] [place] [id number]    Sedation type: Conscious sedation     Anticoagulations? No    Electronic implanted devices? No    Diabetic? No    Indication for procedure: screening     Bowel prep recommendation: Extended prep Golytely  due to BMI    Prep instructions sent via [employer] Bowel prep script sent to [facility] #[id number] [facility] [id number] [place] AT [facility]  [address]    Pre visit planning completed.    Bessie Dwyer RN  Endoscopy Procedure Pre Assessment RN

## 2023-01-26 NOTE — TELEPHONE ENCOUNTER
Attempted to contact patient back. No answer, left VM to call back [phone] option 4 to go over upcoming appointment details.

## 2023-01-27 NOTE — TELEPHONE ENCOUNTER
Second attempt for pre-assessment prior to upcoming colonoscopy     No answer.  Left message to return call [phone] #4    Marian Schaeffer RN  Endoscopy Procedure Pre Assessment RN

## 2023-02-01 ENCOUNTER — TELEPHONE (OUTPATIENT)
Dept: GASTROENTEROLOGY | Facility: CLINIC | Age: 53
End: 2023-02-01
Payer: COMMERCIAL

## 2023-02-01 NOTE — TELEPHONE ENCOUNTER
Caller: Joe De La Garza   Reason for Reschedule/Cancellation (please be detailed, any staff messages or encounters to note?):     PER PT -- NO DRVER/SICK WITH FEVER       Prior to reschedule please review:    Ordering Provider:Barbie Mann MD      Sedation per order:MODERATE     Does patient have any ASC Exclusions, please identify?:     José Miguel Knox MD Soldo, Jennifer, RN; PAVAN Mg Anes Review   Cc: PAVAN Alcaraz Rn Gi Procedures   Due to no recent sleep study I can see and past hypoxic episodes would be best at hospital.     Sunday         Notes on Cancelled Procedure:  1. Procedure:Lower Endoscopy [Colonoscopy]   2. Date: 02/03/2023  3. Location:[facility]; [address] [place] [address]  4. Surgeon: RACHEL         Rescheduled: YES     Procedure: Lower Endoscopy [Colonoscopy]    Date: 03/08/2023    Location:[facility]; [address] [facility], [address] [place] [address]    Surgeon: DOMENIC     Sedation Level Scheduled  MODERATE          Reason for Sedation Level PER ORDER     Prep/Instructions updated and sent: JERRI

## 2023-02-01 NOTE — TELEPHONE ENCOUNTER
Third attempt for pre-assessment prior to upcoming colonoscopy     MyChart  Message sent as well.    No answer.  Left message to return call [phone] #4    Marian Schaeffer RN  Endoscopy Procedure Pre Assessment RN

## 2023-02-22 NOTE — TELEPHONE ENCOUNTER
Rescheduled colonoscopy     Patient scheduled for Colonoscopy  on 3.7.23.     Discuss Covid policy.     Pre op exam scheduled: N/A    Arrival time: 0815. Procedure time 0915    Facility location: [facility]; [address] [facility], [address], [place] [address]    Sedation type: MAC    Anticoagulations? No    Electronic implanted devices? No    Diabetic? No    Indication for procedure: Screening    Bowel prep recommendation: Extended prep Golytely     Prep instructions sent via [employer] Bowel prep script sent to [facility] #[id number] [facility] [id number] [place] AT [facility]  [address]    Pre visit planning completed.    Lenora Maki RN  Endoscopy Procedure Pre Assessment RN

## 2023-02-23 NOTE — TELEPHONE ENCOUNTER
Attempted to contact patient regarding upcoming Colonoscopy  procedure on 3.7.23 for pre assessment questions. No answer.     Left message to return call to [phone] #[phone]      Lenora Maki RN  Endoscopy Procedure Pre Assessment RN

## 2023-02-23 NOTE — TELEPHONE ENCOUNTER
Addendum:    Patient is scheduled 3.8.23 for colonoscopy, NOT 3.7.23 as erroneously entered in previous note.     Lenora Maki RN

## 2023-03-01 NOTE — TELEPHONE ENCOUNTER
Second attempt for pre-assessment prior to upcoming colonoscopy on 3.8.23     No answer.  Left message to return call [phone] #4    MyChart message sent    Lenora Maki RN  Endoscopy Procedure Pre Assessment RN

## 2023-03-06 NOTE — TELEPHONE ENCOUNTER
Pre assessment questions completed for upcoming Colonoscopy  procedure scheduled on 3.8.23    COVID policy reviewed.     Reviewed procedural arrival time 0815 and facility location [facility]; [address] [facility], 3rd Floor, [place] [address]    Designated  policy reviewed. Instructed to have someone stay 6 hours post procedure.     Anticoagulation/blood thinners? No    Electronic implanted devices? No    Diabetic? No    Reviewed extended golytely procedure prep instructions.     Patient verbalized understanding and had no questions or concerns at this time.    Lenora Maki RN  Endoscopy Procedure Pre Assessment RN

## 2023-03-08 ENCOUNTER — HOSPITAL ENCOUNTER (OUTPATIENT)
Facility: CLINIC | Age: 53
Discharge: HOME | End: 2023-03-08
Attending: INTERNAL MEDICINE | Admitting: INTERNAL MEDICINE
Payer: COMMERCIAL

## 2023-03-08 VITALS
RESPIRATION RATE: 15 BRPM | HEIGHT: 72 IN | BODY MASS INDEX: 40.63 KG/M2 | SYSTOLIC BLOOD PRESSURE: 135 MMHG | HEART RATE: 84 BPM | DIASTOLIC BLOOD PRESSURE: 84 MMHG | TEMPERATURE: 98.5 F | OXYGEN SATURATION: 96 % | WEIGHT: 300 LBS

## 2023-03-08 DIAGNOSIS — E66.813 CLASS 3 SEVERE OBESITY WITH BODY MASS INDEX (BMI) OF 40.0 TO 44.9 IN ADULT, UNSPECIFIED OBESITY TYPE, UNSPECIFIED WHETHER SERIOUS COMORBIDITY PRESENT (H): Primary | ICD-10-CM

## 2023-03-08 DIAGNOSIS — E66.01 CLASS 3 SEVERE OBESITY WITH BODY MASS INDEX (BMI) OF 40.0 TO 44.9 IN ADULT, UNSPECIFIED OBESITY TYPE, UNSPECIFIED WHETHER SERIOUS COMORBIDITY PRESENT (H): Primary | ICD-10-CM

## 2023-03-08 LAB
ALBUMIN SERPL BCG-MCNC: 4.6 G/DL (ref 3.5–5.2)
ALP SERPL-CCNC: 37 U/L (ref 40–129)
ALT SERPL W P-5'-P-CCNC: 43 U/L (ref 10–50)
ANION GAP SERPL CALCULATED.3IONS-SCNC: 13 MMOL/L (ref 7–15)
AST SERPL W P-5'-P-CCNC: 35 U/L (ref 10–50)
BILIRUB SERPL-MCNC: 0.9 MG/DL
BUN SERPL-MCNC: 10.1 MG/DL (ref 6–20)
CALCIUM SERPL-MCNC: 9.4 MG/DL (ref 8.6–10)
CHLORIDE SERPL-SCNC: 103 MMOL/L (ref 98–107)
CREAT SERPL-MCNC: 1.02 MG/DL (ref 0.67–1.17)
DEPRECATED HCO3 PLAS-SCNC: 23 MMOL/L (ref 22–29)
ERYTHROCYTE [DISTWIDTH] IN BLOOD BY AUTOMATED COUNT: 13.7 % (ref 10–15)
FERRITIN SERPL-MCNC: 220 NG/ML (ref 31–409)
GFR SERPL CREATININE-BSD FRML MDRD: 88 ML/MIN/1.73M2
GLUCOSE SERPL-MCNC: 82 MG/DL (ref 70–99)
HBV SURFACE AG SERPL QL IA: NONREACTIVE
HCT VFR BLD AUTO: 45.3 % (ref 40–53)
HCV AB SERPL QL IA: NONREACTIVE
HGB BLD-MCNC: 14.6 G/DL (ref 13.3–17.7)
IRON BINDING CAPACITY (ROCHE): 396 UG/DL (ref 240–430)
IRON SATN MFR SERPL: 19 % (ref 15–46)
IRON SERPL-MCNC: 75 UG/DL (ref 61–157)
MCH RBC QN AUTO: 29.1 PG (ref 26.5–33)
MCHC RBC AUTO-ENTMCNC: 32.2 G/DL (ref 31.5–36.5)
MCV RBC AUTO: 90 FL (ref 78–100)
PLATELET # BLD AUTO: 231 10E3/UL (ref 150–450)
POTASSIUM SERPL-SCNC: 4.1 MMOL/L (ref 3.4–5.3)
PROT SERPL-MCNC: 7.3 G/DL (ref 6.4–8.3)
RBC # BLD AUTO: 5.01 10E6/UL (ref 4.4–5.9)
SODIUM SERPL-SCNC: 139 MMOL/L (ref 136–145)
WBC # BLD AUTO: 7.1 10E3/UL (ref 4–11)

## 2023-03-08 PROCEDURE — 83550 IRON BINDING TEST: CPT | Performed by: INTERNAL MEDICINE

## 2023-03-08 PROCEDURE — G0500 MOD SEDAT ENDO SERVICE >5YRS: HCPCS | Performed by: INTERNAL MEDICINE

## 2023-03-08 PROCEDURE — 45385 COLONOSCOPY W/LESION REMOVAL: CPT | Mod: PT

## 2023-03-08 PROCEDURE — 999N000099 HC STATISTIC MODERATE SEDATION < 10 MIN: Performed by: INTERNAL MEDICINE

## 2023-03-08 PROCEDURE — 86803 HEPATITIS C AB TEST: CPT | Performed by: INTERNAL MEDICINE

## 2023-03-08 PROCEDURE — 99153 MOD SED SAME PHYS/QHP EA: CPT | Performed by: INTERNAL MEDICINE

## 2023-03-08 PROCEDURE — 80053 COMPREHEN METABOLIC PANEL: CPT | Performed by: INTERNAL MEDICINE

## 2023-03-08 PROCEDURE — 82728 ASSAY OF FERRITIN: CPT | Performed by: INTERNAL MEDICINE

## 2023-03-08 PROCEDURE — 88305 TISSUE EXAM BY PATHOLOGIST: CPT | Mod: TC | Performed by: INTERNAL MEDICINE

## 2023-03-08 PROCEDURE — 87340 HEPATITIS B SURFACE AG IA: CPT | Performed by: INTERNAL MEDICINE

## 2023-03-08 PROCEDURE — 85018 HEMOGLOBIN: CPT | Performed by: INTERNAL MEDICINE

## 2023-03-08 PROCEDURE — 45380 COLONOSCOPY AND BIOPSY: CPT | Performed by: INTERNAL MEDICINE

## 2023-03-08 PROCEDURE — 250N000011 HC RX IP 250 OP 636: Performed by: INTERNAL MEDICINE

## 2023-03-08 PROCEDURE — 88305 TISSUE EXAM BY PATHOLOGIST: CPT | Mod: 26 | Performed by: PATHOLOGY

## 2023-03-08 PROCEDURE — 36415 COLL VENOUS BLD VENIPUNCTURE: CPT | Performed by: INTERNAL MEDICINE

## 2023-03-08 RX ORDER — FENTANYL CITRATE 50 UG/ML
INJECTION, SOLUTION INTRAMUSCULAR; INTRAVENOUS PRN
Status: DISCONTINUED | OUTPATIENT
Start: 2023-03-08 | End: 2023-03-08 | Stop reason: HOSPADM

## 2023-03-08 ASSESSMENT — ACTIVITIES OF DAILY LIVING (ADL)
ADLS_ACUITY_SCORE: 35
ADLS_ACUITY_SCORE: 35

## 2023-03-08 NOTE — OR NURSING
Pt had colonoscopy under moderate sedation with polypectomy and biopsy, pt tolerated procedure very well. Pt stable at time of transfer to . Procedural report to Jean Carlos GARAY for transfer.

## 2023-03-08 NOTE — PROGRESS NOTES
Hepatology Staff    See his colonoscopy report. This showed prominent rectal veins, which might reflect portal HTN.    After discussing things with the patient and his wife, I have ordered some baseline liver tests and an Abd US, and will arrange for an appt in Hepatology Clinic.

## 2023-03-08 NOTE — H&P
ENDOSCOPY PRE-SEDATION H&P FOR OUTPATIENT PROCEDURES    Joe De La Garza  [id number]  1970    Procedure: Colonoscopy     Pre-procedure diagnosis: screening    Past medical history:   Past Medical History:   Diagnosis Date     ADD (attention deficit disorder) without hyperactivity 8/31/2010     Diverticulitis of colon 12/28/2011     Patient Active Problem List   Diagnosis     ADD (attention deficit disorder) without hyperactivity     CARDIOVASCULAR SCREENING; LDL GOAL LESS THAN 160     Diverticulitis of colon     MONTY (obstructive sleep apnea)     Syncope and collapse     Obesity     Morbid obesity (H)       Past surgical history:   Past Surgical History:   Procedure Laterality Date     ARTHROSCOPY KNEE RT/LT      RT - 5/19/06; LT - 1987     HERNIA REPAIR, INGUINAL RT/LT  age 11    RT     [place] SPINE SURGERY PROCEDURE UNLISTED  ~1996    Laminectomy L4-5     SURGICAL HISTORY OF -   2004    RT Distal Bicep Repair       No current facility-administered medications for this encounter.       No Known Allergies    History of Anesthesia/Sedation Problems: no    Physical Exam:    Mental status: alert  Heart: Normal  Lung: Normal  Assessment of patient's airway: Normal  Other as pertinent for procedure: None     Lab Results   Component Value Date    WBC 10.4 04/22/2014     Lab Results   Component Value Date    RBC 5.11 04/22/2014     Lab Results   Component Value Date    HGB 15.2 04/22/2014     Lab Results   Component Value Date    HCT 44.4 04/22/2014     Lab Results   Component Value Date    MCV 87 04/22/2014     Lab Results   Component Value Date    MCH 29.8 04/22/2014     Lab Results   Component Value Date    MCHC 34.3 04/22/2014     Lab Results   Component Value Date    RDW 12.4 04/22/2014     Lab Results   Component Value Date     04/22/2014     No results found for: INR     ASA Score: See Provation note    Mallampati score:  II - Faucial pillars and soft palate may be seen, but uvula is masked by the base of  the tongue    Assessment/Plan:     The patient is an appropriate candidate to receive sedation.    Informed consent was discussed with the patient/family, including the risks, benefits, potential complications and any alternative options associated with sedation.    Patient assessment completed just prior to sedation and while under constant observation by the provider. Condition determined to be adequate for proceeding with sedation.    The specific risks for the procedure were discussed with the patient at the time of informed consent and include but are not limited to perforation which could require surgery, missing significant neoplasm or lesion, hemorrhage and adverse sedative complication.      Yisel Summers MD

## 2023-03-09 LAB
COLONOSCOPY: NORMAL
PATH REPORT.COMMENTS IMP SPEC: NORMAL
PATH REPORT.COMMENTS IMP SPEC: NORMAL
PATH REPORT.FINAL DX SPEC: NORMAL
PATH REPORT.GROSS SPEC: NORMAL
PATH REPORT.MICROSCOPIC SPEC OTHER STN: NORMAL
PATH REPORT.RELEVANT HX SPEC: NORMAL
PHOTO IMAGE: NORMAL

## 2023-03-09 NOTE — RESULT ENCOUNTER NOTE
"Mr. De La Garza -     I am writing to let you know the results of the polyp that was removed at the time of your colonoscopy.  The polyp returned as an \"adenomatous polyp\".  An adenoma is a type of benign polyp. If left in place for years, adenomas have a small risk of developing cancer.  There was no cancer in your polyp.  Your polyp was completely removed, but you are at risk to grow more adenomatous polyps in the future.      Based on your findings, I recommend a repeat colonoscopy in about 5 years with a more extensive prep. I suggest that you discuss this with your primary care provider(s) at that time.    Your blood tests did not show evidence of hepatitis B or C, or of iron overload. As we discussed, I suggest that you have an abdominal ultrasound and then follow-up in Hepatology (Liver) Clinic.    If you have any questions, please don't hesitate to contact the Gastroenterology nurse at [phone].       Jack Beckford MD  Professor of Medicine  Division of Gastroenterology, Hepatology, and Nutrition  [facility] "

## 2023-03-13 ENCOUNTER — TELEPHONE (OUTPATIENT)
Dept: GASTROENTEROLOGY | Facility: CLINIC | Age: 53
End: 2023-03-13
Payer: COMMERCIAL

## 2023-03-13 NOTE — TELEPHONE ENCOUNTER
LVM & sent MyChart message // pt needs to schedule US Abdomen and labs asap THEN New Liver appt with Dr. Beckford next available // first attempt, AN 3.13.23

## 2023-03-16 ENCOUNTER — TELEPHONE (OUTPATIENT)
Dept: GASTROENTEROLOGY | Facility: CLINIC | Age: 53
End: 2023-03-16
Payer: COMMERCIAL

## 2023-03-16 NOTE — TELEPHONE ENCOUNTER
LVM // pt needs to schedule US Abdomen and labs asap THEN New Liver appt with Dr. Beckford next available // second attempt, AN 3.16.23

## 2023-04-13 ENCOUNTER — OFFICE VISIT (OUTPATIENT)
Dept: URGENT CARE | Facility: URGENT CARE | Age: 53
End: 2023-04-13
Payer: COMMERCIAL

## 2023-04-13 VITALS
OXYGEN SATURATION: 95 % | BODY MASS INDEX: 41.75 KG/M2 | WEIGHT: 307.8 LBS | DIASTOLIC BLOOD PRESSURE: 85 MMHG | SYSTOLIC BLOOD PRESSURE: 142 MMHG | TEMPERATURE: 98.8 F | HEART RATE: 83 BPM

## 2023-04-13 DIAGNOSIS — K57.32 DIVERTICULITIS OF COLON: Primary | ICD-10-CM

## 2023-04-13 LAB
BASOPHILS # BLD AUTO: 0 10E3/UL (ref 0–0.2)
BASOPHILS NFR BLD AUTO: 0 %
EOSINOPHIL # BLD AUTO: 0.2 10E3/UL (ref 0–0.7)
EOSINOPHIL NFR BLD AUTO: 2 %
ERYTHROCYTE [DISTWIDTH] IN BLOOD BY AUTOMATED COUNT: 13.5 % (ref 10–15)
HCT VFR BLD AUTO: 40.8 % (ref 40–53)
HGB BLD-MCNC: 13.6 G/DL (ref 13.3–17.7)
IMM GRANULOCYTES # BLD: 0 10E3/UL
IMM GRANULOCYTES NFR BLD: 0 %
LYMPHOCYTES # BLD AUTO: 1.9 10E3/UL (ref 0.8–5.3)
LYMPHOCYTES NFR BLD AUTO: 21 %
MCH RBC QN AUTO: 29.6 PG (ref 26.5–33)
MCHC RBC AUTO-ENTMCNC: 33.3 G/DL (ref 31.5–36.5)
MCV RBC AUTO: 89 FL (ref 78–100)
MONOCYTES # BLD AUTO: 0.7 10E3/UL (ref 0–1.3)
MONOCYTES NFR BLD AUTO: 8 %
NEUTROPHILS # BLD AUTO: 6.2 10E3/UL (ref 1.6–8.3)
NEUTROPHILS NFR BLD AUTO: 68 %
PLATELET # BLD AUTO: 223 10E3/UL (ref 150–450)
RBC # BLD AUTO: 4.59 10E6/UL (ref 4.4–5.9)
WBC # BLD AUTO: 9.1 10E3/UL (ref 4–11)

## 2023-04-13 PROCEDURE — 36415 COLL VENOUS BLD VENIPUNCTURE: CPT | Performed by: NURSE PRACTITIONER

## 2023-04-13 PROCEDURE — 99214 OFFICE O/P EST MOD 30 MIN: CPT | Performed by: NURSE PRACTITIONER

## 2023-04-13 PROCEDURE — 85025 COMPLETE CBC W/AUTO DIFF WBC: CPT | Performed by: NURSE PRACTITIONER

## 2023-04-13 RX ORDER — CIPROFLOXACIN 500 MG/1
500 TABLET, FILM COATED ORAL 2 TIMES DAILY
Qty: 14 TABLET | Refills: 0 | Status: CANCELLED | OUTPATIENT
Start: 2023-04-13 | End: 2023-04-20

## 2023-04-13 RX ORDER — METRONIDAZOLE 500 MG/1
500 TABLET ORAL 3 TIMES DAILY
Qty: 21 TABLET | Refills: 0 | Status: CANCELLED | OUTPATIENT
Start: 2023-04-13 | End: 2023-04-20

## 2023-04-13 NOTE — PROGRESS NOTES
SUBJECTIVE  HPI:  Joe De La Garza is a 52 year old male who presents with the CC of abdominal pain.    Pain started 1 1/2 days ago. Started feels like typical diverticulitis flare up. Located lower abdomen, slightly left. Pain is moderate, fluctuating sharp pains.   (Diagnosed initially 10 years ago, has flare ups every 6 months to 1 yr sometimes has gone a few years in between  Pt has taken Tylenol last night for low grade fever.  Has been doing clear liquids.   No vomiting  Hydrated     Past Medical History:   Diagnosis Date     ADD (attention deficit disorder) without hyperactivity 8/31/2010     Diverticulitis of colon 12/28/2011     Current Outpatient Medications   Medication Sig Dispense Refill     bisacodyl (DULCOLAX) 5 MG EC tablet 2 days prior to procedure, take 2 tablets at 4 pm. 1 day prior to procedure, take 2 tablets at 4 pm. For additional instructions refer to your colonoscopy prep instructions. 4 tablet 0     polyethylene glycol (GOLYTELY) 236 g suspension 2 days prior at 5pm, mix and drink half of a jug of Golytely. Drink an 8 oz. glass of Golytely every 15 minutes until half of the jug is gone. Place remainder of Golytely in the refrigerator. 1 day prior at 5 pm, drink the 2nd half of a jug of Golytely bowel prep. 6 hours before your check-in time, drink an 8 oz. glass of Golytely every 15 minutes until half of the 2nd jug of Golytely is gone. Discard remainder of second jug. 8000 mL 0     Social History     Tobacco Use     Smoking status: Former     Types: Cigarettes     Smokeless tobacco: Never   Vaping Use     Vaping status: Never Used   Substance Use Topics     Alcohol use: No       ROS:  Review of systems negative except as stated above.    OBJECTIVE:  BP (!) 142/85 (BP Location: Left arm, Patient Position: Sitting, Cuff Size: Adult Large)   Pulse 83   Temp 98.8  F (37.1  C) (Tympanic)   Wt 139.6 kg (307 lb 12.8 oz)   SpO2 95%   BMI 41.75 kg/m    GENERAL APPEARANCE: alert and no  distress  EYES: EOMI,  PERRL, conjunctiva clear  HENT: ear canals and TM's normal.  Nose and mouth without ulcers, erythema or lesions  NECK: supple, nontender, no lymphadenopathy  RESP: lungs clear to auscultation - no rales, rhonchi or wheezes  CV: regular rates and rhythm, normal S1 S2, no murmur noted  ABDOMEN: soft, normal bowel sounds, tenderness moderate LLQ  NEURO: Normal strength and tone, sensory exam grossly normal,  normal speech and mentation  SKIN: no suspicious lesions or rashes    ASSESSMENT:  (K57.32) Diverticulitis of colon  (primary encounter diagnosis)    Plan: CBC with platelets and differential pending will notify  amoxicillin-clavulanate (AUGMENTIN) 875-125 MG  tablet  Discussed diet  Emergent sx to ER  Not improving f/u with pcp    TERESA Alexander CNP

## 2023-06-10 ENCOUNTER — HEALTH MAINTENANCE LETTER (OUTPATIENT)
Age: 53
End: 2023-06-10

## 2024-01-03 ENCOUNTER — E-VISIT (OUTPATIENT)
Dept: FAMILY MEDICINE | Facility: CLINIC | Age: 54
End: 2024-01-03
Payer: COMMERCIAL

## 2024-01-03 DIAGNOSIS — Z87.19 HX OF DIVERTICULITIS OF COLON: ICD-10-CM

## 2024-01-03 DIAGNOSIS — R10.32 LLQ ABDOMINAL PAIN: ICD-10-CM

## 2024-01-03 PROCEDURE — 99421 OL DIG E/M SVC 5-10 MIN: CPT | Performed by: FAMILY MEDICINE

## 2024-01-03 RX ORDER — METRONIDAZOLE 500 MG/1
500 TABLET ORAL 3 TIMES DAILY
Qty: 30 TABLET | Refills: 0 | Status: SHIPPED | OUTPATIENT
Start: 2024-01-03 | End: 2024-02-23

## 2024-01-03 RX ORDER — CIPROFLOXACIN 500 MG/1
500 TABLET, FILM COATED ORAL 2 TIMES DAILY
Qty: 20 TABLET | Refills: 0 | Status: SHIPPED | OUTPATIENT
Start: 2024-01-03 | End: 2024-02-23

## 2024-02-23 ENCOUNTER — E-VISIT (OUTPATIENT)
Dept: FAMILY MEDICINE | Facility: CLINIC | Age: 54
End: 2024-02-23
Payer: COMMERCIAL

## 2024-02-23 DIAGNOSIS — Z87.19 HX OF DIVERTICULITIS OF COLON: ICD-10-CM

## 2024-02-23 DIAGNOSIS — R10.32 LLQ ABDOMINAL PAIN: ICD-10-CM

## 2024-02-23 PROCEDURE — 99207 PR NON-BILLABLE SERV PER CHARTING: CPT | Performed by: FAMILY MEDICINE

## 2024-02-23 RX ORDER — CIPROFLOXACIN 500 MG/1
500 TABLET, FILM COATED ORAL 2 TIMES DAILY
Qty: 20 TABLET | Refills: 0 | Status: SHIPPED | OUTPATIENT
Start: 2024-02-23

## 2024-02-23 RX ORDER — METRONIDAZOLE 500 MG/1
500 TABLET ORAL 3 TIMES DAILY
Qty: 30 TABLET | Refills: 0 | Status: SHIPPED | OUTPATIENT
Start: 2024-02-23

## 2024-08-03 ENCOUNTER — HEALTH MAINTENANCE LETTER (OUTPATIENT)
Age: 54
End: 2024-08-03

## 2024-10-16 ENCOUNTER — E-VISIT (OUTPATIENT)
Dept: FAMILY MEDICINE | Facility: CLINIC | Age: 54
End: 2024-10-16
Payer: COMMERCIAL

## 2024-10-16 DIAGNOSIS — Z87.19 HX OF DIVERTICULITIS OF COLON: ICD-10-CM

## 2024-10-16 DIAGNOSIS — R10.32 LLQ ABDOMINAL PAIN: ICD-10-CM

## 2024-10-16 PROCEDURE — 99207 PR NON-BILLABLE SERV PER CHARTING: CPT | Performed by: FAMILY MEDICINE

## 2024-10-16 RX ORDER — METRONIDAZOLE 500 MG/1
500 TABLET ORAL 3 TIMES DAILY
Qty: 30 TABLET | Refills: 0 | Status: SHIPPED | OUTPATIENT
Start: 2024-10-16

## 2024-10-16 RX ORDER — CIPROFLOXACIN 500 MG/1
500 TABLET, FILM COATED ORAL 2 TIMES DAILY
Qty: 20 TABLET | Refills: 0 | Status: SHIPPED | OUTPATIENT
Start: 2024-10-16

## 2025-08-09 ENCOUNTER — MYC REFILL (OUTPATIENT)
Dept: FAMILY MEDICINE | Facility: CLINIC | Age: 55
End: 2025-08-09
Payer: COMMERCIAL

## 2025-08-09 DIAGNOSIS — Z87.19 HX OF DIVERTICULITIS OF COLON: ICD-10-CM

## 2025-08-09 DIAGNOSIS — R10.32 LLQ ABDOMINAL PAIN: ICD-10-CM

## 2025-08-09 RX ORDER — METRONIDAZOLE 500 MG/1
500 TABLET ORAL 3 TIMES DAILY
Qty: 30 TABLET | Refills: 0 | Status: CANCELLED | OUTPATIENT
Start: 2025-08-09

## (undated) RX ORDER — FENTANYL CITRATE 50 UG/ML
INJECTION, SOLUTION INTRAMUSCULAR; INTRAVENOUS
Status: DISPENSED
Start: 2023-03-08